# Patient Record
Sex: MALE | Race: WHITE
[De-identification: names, ages, dates, MRNs, and addresses within clinical notes are randomized per-mention and may not be internally consistent; named-entity substitution may affect disease eponyms.]

---

## 2020-08-08 ENCOUNTER — HOSPITAL ENCOUNTER (INPATIENT)
Dept: HOSPITAL 19 - COL.ER | Age: 80
LOS: 5 days | Discharge: NURSING, REHAB OR LONG-TERM CARE | DRG: 871 | End: 2020-08-13
Attending: STUDENT IN AN ORGANIZED HEALTH CARE EDUCATION/TRAINING PROGRAM | Admitting: STUDENT IN AN ORGANIZED HEALTH CARE EDUCATION/TRAINING PROGRAM
Payer: MEDICARE

## 2020-08-08 VITALS — OXYGEN SATURATION: 96 %

## 2020-08-08 VITALS — OXYGEN SATURATION: 93 %

## 2020-08-08 VITALS — OXYGEN SATURATION: 95 %

## 2020-08-08 VITALS — OXYGEN SATURATION: 94 %

## 2020-08-08 VITALS — OXYGEN SATURATION: 97 %

## 2020-08-08 VITALS — OXYGEN SATURATION: 92 %

## 2020-08-08 VITALS — OXYGEN SATURATION: 89 %

## 2020-08-08 VITALS
SYSTOLIC BLOOD PRESSURE: 101 MMHG | OXYGEN SATURATION: 96 % | HEART RATE: 48 BPM | TEMPERATURE: 98.8 F | DIASTOLIC BLOOD PRESSURE: 73 MMHG

## 2020-08-08 VITALS
OXYGEN SATURATION: 95 % | HEART RATE: 65 BPM | SYSTOLIC BLOOD PRESSURE: 110 MMHG | DIASTOLIC BLOOD PRESSURE: 78 MMHG | TEMPERATURE: 98.9 F

## 2020-08-08 VITALS — OXYGEN SATURATION: 98 %

## 2020-08-08 VITALS
TEMPERATURE: 100.6 F | OXYGEN SATURATION: 93 % | HEART RATE: 82 BPM | SYSTOLIC BLOOD PRESSURE: 102 MMHG | DIASTOLIC BLOOD PRESSURE: 65 MMHG

## 2020-08-08 VITALS — OXYGEN SATURATION: 91 %

## 2020-08-08 VITALS — OXYGEN SATURATION: 88 %

## 2020-08-08 VITALS — OXYGEN SATURATION: 87 %

## 2020-08-08 VITALS
HEART RATE: 76 BPM | DIASTOLIC BLOOD PRESSURE: 71 MMHG | OXYGEN SATURATION: 95 % | TEMPERATURE: 100.5 F | SYSTOLIC BLOOD PRESSURE: 106 MMHG

## 2020-08-08 VITALS — OXYGEN SATURATION: 90 %

## 2020-08-08 VITALS — OXYGEN SATURATION: 85 %

## 2020-08-08 VITALS — WEIGHT: 222.23 LBS | BODY MASS INDEX: 30.1 KG/M2 | HEIGHT: 72.01 IN

## 2020-08-08 DIAGNOSIS — J96.12: ICD-10-CM

## 2020-08-08 DIAGNOSIS — E11.40: ICD-10-CM

## 2020-08-08 DIAGNOSIS — D69.6: ICD-10-CM

## 2020-08-08 DIAGNOSIS — E83.42: ICD-10-CM

## 2020-08-08 DIAGNOSIS — N40.0: ICD-10-CM

## 2020-08-08 DIAGNOSIS — E87.6: ICD-10-CM

## 2020-08-08 DIAGNOSIS — E78.5: ICD-10-CM

## 2020-08-08 DIAGNOSIS — I63.9: ICD-10-CM

## 2020-08-08 DIAGNOSIS — F43.10: ICD-10-CM

## 2020-08-08 DIAGNOSIS — A41.9: Primary | ICD-10-CM

## 2020-08-08 DIAGNOSIS — Z20.828: ICD-10-CM

## 2020-08-08 DIAGNOSIS — F32.9: ICD-10-CM

## 2020-08-08 DIAGNOSIS — J96.11: ICD-10-CM

## 2020-08-08 DIAGNOSIS — H10.13: ICD-10-CM

## 2020-08-08 DIAGNOSIS — G89.29: ICD-10-CM

## 2020-08-08 DIAGNOSIS — J44.9: ICD-10-CM

## 2020-08-08 DIAGNOSIS — J98.11: ICD-10-CM

## 2020-08-08 LAB
ALBUMIN SERPL-MCNC: 4.4 GM/DL (ref 3.5–5)
ALP SERPL-CCNC: 79 U/L (ref 50–136)
ALT SERPL-CCNC: 16 U/L (ref 4–49)
ANION GAP SERPL CALC-SCNC: 7 MMOL/L (ref 7–16)
APPEARANCE CSF: CLEAR
APTT PPP: 25.4 SECONDS (ref 26–37)
AST SERPL-CCNC: 22 U/L (ref 15–37)
BASE EXCESS BLDA CALC-SCNC: 1.2 MMOL/L (ref -2–2)
BASE EXCESS BLDA CALC-SCNC: 5.3 MMOL/L (ref -2–2)
BASOPHILS # BLD: 0 10*3/UL (ref 0–0.2)
BASOPHILS NFR BLD AUTO: 0.1 % (ref 0–2)
BILIRUB SERPL-MCNC: 0.4 MG/DL (ref 0–1)
BUN SERPL-MCNC: 22 MG/DL (ref 9–20)
CALCIUM SERPL-MCNC: 10 MG/DL (ref 8.4–10.2)
CHLORIDE SERPL-SCNC: 99 MMOL/L (ref 98–107)
CO2 BLDA-SCNC: 28.9 MMOL/L
CO2 BLDA-SCNC: 33.4 MMOL/L
CO2 SERPL-SCNC: 34 MMOL/L (ref 22–30)
COLOR CSF: COLORLESS
CREAT SERPL-SCNC: 1.18 UMOL/L (ref 0.66–1.25)
CRP SERPL-MCNC: 1.8 MG/DL (ref 0–0.9)
EOSINOPHIL # BLD: 0 10*3/UL (ref 0–0.7)
EOSINOPHIL NFR BLD: 0.3 % (ref 0–4)
ERYTHROCYTE [DISTWIDTH] IN BLOOD BY AUTOMATED COUNT: 13.2 % (ref 11.5–14.5)
GLUCOSE SERPL-MCNC: 105 MG/DL (ref 74–106)
GRANULOCYTES # BLD AUTO: 87.2 % (ref 42.2–75.2)
GRANULOCYTES NFR CSF MANUAL: 100 % (ref 0–6)
HCO3 BLDA-SCNC: 27.4 MEQ/L (ref 22–26)
HCO3 BLDA-SCNC: 31.7 MEQ/L (ref 22–26)
HCT VFR BLD AUTO: 43 % (ref 42–52)
HGB BLD-MCNC: 13.9 G/DL (ref 13.5–18)
INR BLD: 1.1 (ref 0.8–3)
LYMPHOCYTES # BLD: 0.5 10*3/UL (ref 1.2–3.4)
LYMPHOCYTES NFR BLD: 7.8 % (ref 20–51)
MAGNESIUM SERPL-MCNC: 1.5 MG/DL (ref 1.6–2.3)
MCH RBC QN AUTO: 32 PG (ref 27–31)
MCHC RBC AUTO-ENTMCNC: 32 G/DL (ref 33–37)
MCV RBC AUTO: 98 FL (ref 80–100)
MONOCYTES # BLD: 0.3 10*3/UL (ref 0.1–0.6)
MONOCYTES NFR BLD AUTO: 4.3 % (ref 1.7–9.3)
MONOCYTES NFR CSF MANUAL: 0 % (ref 70–100)
NEUTROPHILS # BLD: 6.1 10*3/UL (ref 1.4–6.5)
PCO2 BLDA: 50.1 MMHG (ref 35–45)
PCO2 BLDA: 54.2 MMHG (ref 35–45)
PH UR STRIP.AUTO: 6 [PH] (ref 5–8)
PLATELET # BLD AUTO: 90 K/MM3 (ref 130–400)
PMV BLD AUTO: 13.7 FL (ref 7.4–10.4)
PO2 BLDA: 77.8 MMHG (ref 80–100)
PO2 BLDA: 82.5 MMHG (ref 80–100)
POTASSIUM SERPL-SCNC: 3.6 MMOL/L (ref 3.4–5)
PROT CSF-MCNC: 62 MG/DL (ref 15–45)
PROT SERPL-MCNC: 7.2 GM/DL (ref 6.4–8.2)
PROTHROMBIN TIME: 12.2 SECONDS (ref 9.7–12.8)
RBC # BLD AUTO: 4.37 M/MM3 (ref 4.2–5.6)
RBC # CSF: 24 /MM3 (ref 0–0)
RBC # UR STRIP.AUTO: (no result) /UL
RBC # UR: (no result) /HPF
SAO2 % BLDA: 95.2 % (ref 92–100)
SAO2 % BLDA: 95.8 % (ref 92–100)
SODIUM SERPL-SCNC: 141 MMOL/L (ref 137–145)
SP GR UR STRIP.AUTO: 1.01 (ref 1–1.03)
SQUAMOUS # URNS: (no result) /HPF
TROPONIN I SERPL-MCNC: 0.03 NG/ML (ref 0–0.04)
URN COLLECT METHOD CLASS: (no result)

## 2020-08-08 PROCEDURE — C1892 INTRO/SHEATH,FIXED,PEEL-AWAY: HCPCS

## 2020-08-08 PROCEDURE — C1751 CATH, INF, PER/CENT/MIDLINE: HCPCS

## 2020-08-09 VITALS — OXYGEN SATURATION: 96 %

## 2020-08-09 VITALS — OXYGEN SATURATION: 98 %

## 2020-08-09 VITALS — OXYGEN SATURATION: 97 %

## 2020-08-09 VITALS — OXYGEN SATURATION: 92 %

## 2020-08-09 VITALS — OXYGEN SATURATION: 95 %

## 2020-08-09 VITALS — OXYGEN SATURATION: 90 %

## 2020-08-09 VITALS — OXYGEN SATURATION: 94 %

## 2020-08-09 VITALS
OXYGEN SATURATION: 96 % | DIASTOLIC BLOOD PRESSURE: 69 MMHG | TEMPERATURE: 98.1 F | SYSTOLIC BLOOD PRESSURE: 108 MMHG | HEART RATE: 70 BPM

## 2020-08-09 VITALS — OXYGEN SATURATION: 91 %

## 2020-08-09 VITALS — OXYGEN SATURATION: 93 %

## 2020-08-09 VITALS — OXYGEN SATURATION: 89 %

## 2020-08-09 VITALS — OXYGEN SATURATION: 87 %

## 2020-08-09 VITALS
OXYGEN SATURATION: 95 % | TEMPERATURE: 98.5 F | SYSTOLIC BLOOD PRESSURE: 118 MMHG | HEART RATE: 55 BPM | DIASTOLIC BLOOD PRESSURE: 75 MMHG

## 2020-08-09 VITALS — DIASTOLIC BLOOD PRESSURE: 58 MMHG | OXYGEN SATURATION: 95 % | HEART RATE: 84 BPM | SYSTOLIC BLOOD PRESSURE: 133 MMHG

## 2020-08-09 VITALS
SYSTOLIC BLOOD PRESSURE: 118 MMHG | HEART RATE: 45 BPM | DIASTOLIC BLOOD PRESSURE: 74 MMHG | TEMPERATURE: 99.1 F | OXYGEN SATURATION: 94 %

## 2020-08-09 VITALS — TEMPERATURE: 98.1 F | DIASTOLIC BLOOD PRESSURE: 80 MMHG | HEART RATE: 67 BPM | SYSTOLIC BLOOD PRESSURE: 134 MMHG

## 2020-08-09 VITALS — HEART RATE: 75 BPM | TEMPERATURE: 98.2 F | DIASTOLIC BLOOD PRESSURE: 70 MMHG | SYSTOLIC BLOOD PRESSURE: 149 MMHG

## 2020-08-09 VITALS — OXYGEN SATURATION: 86 %

## 2020-08-09 VITALS — OXYGEN SATURATION: 85 %

## 2020-08-09 VITALS — OXYGEN SATURATION: 88 %

## 2020-08-09 LAB
ANION GAP SERPL CALC-SCNC: 3 MMOL/L (ref 7–16)
BASE EXCESS BLDA CALC-SCNC: 1.8 MMOL/L (ref -2–2)
BASOPHILS # BLD: 0 10*3/UL (ref 0–0.2)
BASOPHILS NFR BLD AUTO: 0.1 % (ref 0–2)
BUN SERPL-MCNC: 20 MG/DL (ref 9–20)
CALCIUM SERPL-MCNC: 9.1 MG/DL (ref 8.4–10.2)
CHLORIDE SERPL-SCNC: 106 MMOL/L (ref 98–107)
CO2 BLDA-SCNC: 28.3 MMOL/L
CO2 SERPL-SCNC: 30 MMOL/L (ref 22–30)
CREAT SERPL-SCNC: 1.04 UMOL/L (ref 0.66–1.25)
EOSINOPHIL # BLD: 0 10*3/UL (ref 0–0.7)
EOSINOPHIL NFR BLD: 0.1 % (ref 0–4)
ERYTHROCYTE [DISTWIDTH] IN BLOOD BY AUTOMATED COUNT: 13.3 % (ref 11.5–14.5)
GLUCOSE SERPL-MCNC: 100 MG/DL (ref 74–106)
GRANULOCYTES # BLD AUTO: 77.9 % (ref 42.2–75.2)
HCO3 BLDA-SCNC: 26.9 MEQ/L (ref 22–26)
HCT VFR BLD AUTO: 36.7 % (ref 42–52)
HGB BLD-MCNC: 11.5 G/DL (ref 13.5–18)
INHALED O2 CONCENTRATION: 30 %
LYMPHOCYTES # BLD: 1 10*3/UL (ref 1.2–3.4)
LYMPHOCYTES NFR BLD: 12.7 % (ref 20–51)
MCH RBC QN AUTO: 31 PG (ref 27–31)
MCHC RBC AUTO-ENTMCNC: 31 G/DL (ref 33–37)
MCV RBC AUTO: 100 FL (ref 80–100)
MONOCYTES # BLD: 0.7 10*3/UL (ref 0.1–0.6)
MONOCYTES NFR BLD AUTO: 8.7 % (ref 1.7–9.3)
NEUTROPHILS # BLD: 5.9 10*3/UL (ref 1.4–6.5)
PCO2 BLDA: 44.4 MMHG (ref 35–45)
PLATELET # BLD AUTO: 77 K/MM3 (ref 130–400)
PMV BLD AUTO: 13.6 FL (ref 7.4–10.4)
PO2 BLDA: 92.2 MMHG (ref 80–100)
POTASSIUM SERPL-SCNC: 4.2 MMOL/L (ref 3.4–5)
RBC # BLD AUTO: 3.66 M/MM3 (ref 4.2–5.6)
SAO2 % BLDA: 96.9 % (ref 92–100)
SODIUM SERPL-SCNC: 140 MMOL/L (ref 137–145)

## 2020-08-10 VITALS — SYSTOLIC BLOOD PRESSURE: 115 MMHG | HEART RATE: 81 BPM | TEMPERATURE: 98.3 F | DIASTOLIC BLOOD PRESSURE: 80 MMHG

## 2020-08-10 VITALS — HEART RATE: 65 BPM | DIASTOLIC BLOOD PRESSURE: 67 MMHG | SYSTOLIC BLOOD PRESSURE: 124 MMHG | TEMPERATURE: 98.3 F

## 2020-08-10 VITALS — DIASTOLIC BLOOD PRESSURE: 81 MMHG | SYSTOLIC BLOOD PRESSURE: 137 MMHG | TEMPERATURE: 98.3 F | HEART RATE: 65 BPM

## 2020-08-10 VITALS — TEMPERATURE: 97.8 F | HEART RATE: 81 BPM | DIASTOLIC BLOOD PRESSURE: 63 MMHG | SYSTOLIC BLOOD PRESSURE: 112 MMHG

## 2020-08-10 VITALS — HEART RATE: 68 BPM | TEMPERATURE: 98 F | SYSTOLIC BLOOD PRESSURE: 105 MMHG | DIASTOLIC BLOOD PRESSURE: 66 MMHG

## 2020-08-10 VITALS — DIASTOLIC BLOOD PRESSURE: 78 MMHG | HEART RATE: 70 BPM | TEMPERATURE: 97.6 F | SYSTOLIC BLOOD PRESSURE: 137 MMHG

## 2020-08-10 LAB
ANION GAP SERPL CALC-SCNC: 4 MMOL/L (ref 7–16)
BASOPHILS # BLD: 0 10*3/UL (ref 0–0.2)
BASOPHILS NFR BLD AUTO: 0.3 % (ref 0–2)
BUN SERPL-MCNC: 16 MG/DL (ref 9–20)
CALCIUM SERPL-MCNC: 9.2 MG/DL (ref 8.4–10.2)
CHLORIDE SERPL-SCNC: 105 MMOL/L (ref 98–107)
CO2 SERPL-SCNC: 31 MMOL/L (ref 22–30)
CREAT SERPL-SCNC: 1.1 UMOL/L (ref 0.66–1.25)
EOSINOPHIL # BLD: 0 10*3/UL (ref 0–0.7)
EOSINOPHIL NFR BLD: 0.7 % (ref 0–4)
ERYTHROCYTE [DISTWIDTH] IN BLOOD BY AUTOMATED COUNT: 13.4 % (ref 11.5–14.5)
GLUCOSE SERPL-MCNC: 86 MG/DL (ref 74–106)
GRANULOCYTES # BLD AUTO: 69.1 % (ref 42.2–75.2)
HCT VFR BLD AUTO: 37.8 % (ref 42–52)
HGB BLD-MCNC: 12.1 G/DL (ref 13.5–18)
LYMPHOCYTES # BLD: 1.3 10*3/UL (ref 1.2–3.4)
LYMPHOCYTES NFR BLD: 21.2 % (ref 20–51)
MCH RBC QN AUTO: 32 PG (ref 27–31)
MCHC RBC AUTO-ENTMCNC: 32 G/DL (ref 33–37)
MCV RBC AUTO: 99 FL (ref 80–100)
MONOCYTES # BLD: 0.5 10*3/UL (ref 0.1–0.6)
MONOCYTES NFR BLD AUTO: 8.2 % (ref 1.7–9.3)
NEUTROPHILS # BLD: 4.2 10*3/UL (ref 1.4–6.5)
PLATELET # BLD AUTO: 82 K/MM3 (ref 130–400)
PMV BLD AUTO: 12.8 FL (ref 7.4–10.4)
POTASSIUM SERPL-SCNC: 3.4 MMOL/L (ref 3.4–5)
RBC # BLD AUTO: 3.83 M/MM3 (ref 4.2–5.6)
SODIUM SERPL-SCNC: 140 MMOL/L (ref 137–145)

## 2020-08-10 PROCEDURE — 02HV33Z INSERTION OF INFUSION DEVICE INTO SUPERIOR VENA CAVA, PERCUTANEOUS APPROACH: ICD-10-PCS

## 2020-08-11 VITALS — HEART RATE: 86 BPM | TEMPERATURE: 98.5 F | DIASTOLIC BLOOD PRESSURE: 56 MMHG | SYSTOLIC BLOOD PRESSURE: 98 MMHG

## 2020-08-11 VITALS — HEART RATE: 72 BPM | TEMPERATURE: 98.2 F | SYSTOLIC BLOOD PRESSURE: 127 MMHG | DIASTOLIC BLOOD PRESSURE: 46 MMHG

## 2020-08-11 VITALS — SYSTOLIC BLOOD PRESSURE: 123 MMHG | DIASTOLIC BLOOD PRESSURE: 72 MMHG | HEART RATE: 66 BPM | TEMPERATURE: 98.5 F

## 2020-08-11 VITALS — DIASTOLIC BLOOD PRESSURE: 65 MMHG | SYSTOLIC BLOOD PRESSURE: 128 MMHG | HEART RATE: 76 BPM | TEMPERATURE: 98 F

## 2020-08-11 VITALS — TEMPERATURE: 98.2 F | SYSTOLIC BLOOD PRESSURE: 129 MMHG | HEART RATE: 78 BPM | DIASTOLIC BLOOD PRESSURE: 85 MMHG

## 2020-08-11 VITALS — HEART RATE: 81 BPM | DIASTOLIC BLOOD PRESSURE: 48 MMHG | SYSTOLIC BLOOD PRESSURE: 105 MMHG | TEMPERATURE: 98.4 F

## 2020-08-11 LAB
ANION GAP SERPL CALC-SCNC: 6 MMOL/L (ref 7–16)
BASOPHILS # BLD: 0 10*3/UL (ref 0–0.2)
BASOPHILS NFR BLD AUTO: 0.4 % (ref 0–2)
BUN SERPL-MCNC: 15 MG/DL (ref 9–20)
CALCIUM SERPL-MCNC: 9 MG/DL (ref 8.4–10.2)
CHLORIDE SERPL-SCNC: 102 MMOL/L (ref 98–107)
CO2 SERPL-SCNC: 30 MMOL/L (ref 22–30)
CREAT SERPL-SCNC: 1.03 UMOL/L (ref 0.66–1.25)
EOSINOPHIL # BLD: 0.1 10*3/UL (ref 0–0.7)
EOSINOPHIL NFR BLD: 1.2 % (ref 0–4)
ERYTHROCYTE [DISTWIDTH] IN BLOOD BY AUTOMATED COUNT: 13.2 % (ref 11.5–14.5)
GLUCOSE SERPL-MCNC: 84 MG/DL (ref 74–106)
GRANULOCYTES # BLD AUTO: 67.3 % (ref 42.2–75.2)
HCT VFR BLD AUTO: 35.9 % (ref 42–52)
HGB BLD-MCNC: 11.8 G/DL (ref 13.5–18)
LYMPHOCYTES # BLD: 1.2 10*3/UL (ref 1.2–3.4)
LYMPHOCYTES NFR BLD: 20.4 % (ref 20–51)
MCH RBC QN AUTO: 32 PG (ref 27–31)
MCHC RBC AUTO-ENTMCNC: 33 G/DL (ref 33–37)
MCV RBC AUTO: 97 FL (ref 80–100)
MONOCYTES # BLD: 0.6 10*3/UL (ref 0.1–0.6)
MONOCYTES NFR BLD AUTO: 10.2 % (ref 1.7–9.3)
NEUTROPHILS # BLD: 3.8 10*3/UL (ref 1.4–6.5)
PLATELET # BLD AUTO: 81 K/MM3 (ref 130–400)
PMV BLD AUTO: 12.9 FL (ref 7.4–10.4)
POTASSIUM SERPL-SCNC: 3.2 MMOL/L (ref 3.4–5)
RBC # BLD AUTO: 3.72 M/MM3 (ref 4.2–5.6)
SODIUM SERPL-SCNC: 138 MMOL/L (ref 137–145)

## 2020-08-12 VITALS — TEMPERATURE: 98.6 F | SYSTOLIC BLOOD PRESSURE: 101 MMHG | DIASTOLIC BLOOD PRESSURE: 46 MMHG | HEART RATE: 76 BPM

## 2020-08-12 VITALS — HEART RATE: 64 BPM | DIASTOLIC BLOOD PRESSURE: 76 MMHG | TEMPERATURE: 98.6 F | SYSTOLIC BLOOD PRESSURE: 132 MMHG

## 2020-08-12 VITALS — SYSTOLIC BLOOD PRESSURE: 124 MMHG | TEMPERATURE: 98.6 F | DIASTOLIC BLOOD PRESSURE: 49 MMHG | HEART RATE: 65 BPM

## 2020-08-12 VITALS — TEMPERATURE: 97.8 F | SYSTOLIC BLOOD PRESSURE: 125 MMHG | DIASTOLIC BLOOD PRESSURE: 81 MMHG | HEART RATE: 83 BPM

## 2020-08-12 VITALS — SYSTOLIC BLOOD PRESSURE: 110 MMHG | DIASTOLIC BLOOD PRESSURE: 57 MMHG | TEMPERATURE: 98.7 F | HEART RATE: 66 BPM

## 2020-08-12 VITALS — DIASTOLIC BLOOD PRESSURE: 41 MMHG | TEMPERATURE: 98.1 F | HEART RATE: 83 BPM | SYSTOLIC BLOOD PRESSURE: 115 MMHG

## 2020-08-12 LAB
ANION GAP SERPL CALC-SCNC: 7 MMOL/L (ref 7–16)
BASOPHILS # BLD: 0 10*3/UL (ref 0–0.2)
BASOPHILS NFR BLD AUTO: 0.4 % (ref 0–2)
BUN SERPL-MCNC: 12 MG/DL (ref 9–20)
CALCIUM SERPL-MCNC: 9.2 MG/DL (ref 8.4–10.2)
CHLORIDE SERPL-SCNC: 102 MMOL/L (ref 98–107)
CO2 SERPL-SCNC: 29 MMOL/L (ref 22–30)
CREAT SERPL-SCNC: 1.1 UMOL/L (ref 0.66–1.25)
EOSINOPHIL # BLD: 0.1 10*3/UL (ref 0–0.7)
EOSINOPHIL NFR BLD: 1.6 % (ref 0–4)
ERYTHROCYTE [DISTWIDTH] IN BLOOD BY AUTOMATED COUNT: 13.2 % (ref 11.5–14.5)
GLUCOSE SERPL-MCNC: 86 MG/DL (ref 74–106)
GRANULOCYTES # BLD AUTO: 65.3 % (ref 42.2–75.2)
HCT VFR BLD AUTO: 35.4 % (ref 42–52)
HGB BLD-MCNC: 12 G/DL (ref 13.5–18)
LYMPHOCYTES # BLD: 1.1 10*3/UL (ref 1.2–3.4)
LYMPHOCYTES NFR BLD: 21.4 % (ref 20–51)
MAGNESIUM SERPL-MCNC: 1.5 MG/DL (ref 1.6–2.3)
MCH RBC QN AUTO: 32 PG (ref 27–31)
MCHC RBC AUTO-ENTMCNC: 34 G/DL (ref 33–37)
MCV RBC AUTO: 96 FL (ref 80–100)
MONOCYTES # BLD: 0.6 10*3/UL (ref 0.1–0.6)
MONOCYTES NFR BLD AUTO: 10.9 % (ref 1.7–9.3)
NEUTROPHILS # BLD: 3.4 10*3/UL (ref 1.4–6.5)
PLATELET # BLD AUTO: 91 K/MM3 (ref 130–400)
PMV BLD AUTO: 12.4 FL (ref 7.4–10.4)
POTASSIUM SERPL-SCNC: 3.6 MMOL/L (ref 3.4–5)
RBC # BLD AUTO: 3.7 M/MM3 (ref 4.2–5.6)
SODIUM SERPL-SCNC: 138 MMOL/L (ref 137–145)

## 2020-08-13 VITALS — SYSTOLIC BLOOD PRESSURE: 138 MMHG | TEMPERATURE: 98.1 F | DIASTOLIC BLOOD PRESSURE: 58 MMHG | HEART RATE: 74 BPM

## 2020-08-13 VITALS — HEART RATE: 67 BPM | SYSTOLIC BLOOD PRESSURE: 154 MMHG | TEMPERATURE: 98.7 F | DIASTOLIC BLOOD PRESSURE: 86 MMHG

## 2020-08-13 VITALS — SYSTOLIC BLOOD PRESSURE: 119 MMHG | DIASTOLIC BLOOD PRESSURE: 52 MMHG | TEMPERATURE: 98.6 F | HEART RATE: 76 BPM

## 2020-08-13 VITALS — HEART RATE: 76 BPM | TEMPERATURE: 98.6 F | DIASTOLIC BLOOD PRESSURE: 52 MMHG | SYSTOLIC BLOOD PRESSURE: 119 MMHG

## 2020-08-13 LAB
ANION GAP SERPL CALC-SCNC: 6 MMOL/L (ref 7–16)
BUN SERPL-MCNC: 10 MG/DL (ref 9–20)
CALCIUM SERPL-MCNC: 9.3 MG/DL (ref 8.4–10.2)
CHLORIDE SERPL-SCNC: 102 MMOL/L (ref 98–107)
CHOLEST SPEC-SCNC: 120 MG/DL (ref 120–200)
CHOLEST/HDLC SERPL-SRTO: 3.8
CO2 SERPL-SCNC: 31 MMOL/L (ref 22–30)
CREAT SERPL-SCNC: 1.2 UMOL/L (ref 0.66–1.25)
EOSINOPHIL NFR BLD: 2 % (ref 0–4)
ERYTHROCYTE [DISTWIDTH] IN BLOOD BY AUTOMATED COUNT: 13.2 % (ref 11.5–14.5)
GLUCOSE SERPL-MCNC: 87 MG/DL (ref 74–106)
HCT VFR BLD AUTO: 36.3 % (ref 42–52)
HDLC SERPL-MCNC: 31 MG/DL
HGB BLD-MCNC: 11.9 G/DL (ref 13.5–18)
LDLC SERPL-MCNC: 66 MG/DL
LYMPHOCYTES NFR BLD MANUAL: 29 % (ref 20–51)
MAGNESIUM SERPL-MCNC: 2.2 MG/DL (ref 1.6–2.3)
MCH RBC QN AUTO: 32 PG (ref 27–31)
MCHC RBC AUTO-ENTMCNC: 33 G/DL (ref 33–37)
MCV RBC AUTO: 96 FL (ref 80–100)
MONOCYTES NFR BLD: 6 % (ref 1.7–9.3)
NEUTS BAND NFR BLD: 1 % (ref 0–10)
NEUTS SEG NFR BLD MANUAL: 62 % (ref 42–75.2)
PLATELET # BLD AUTO: 91 K/MM3 (ref 130–400)
PLATELET BLD QL SMEAR: (no result)
PMV BLD AUTO: 12.9 FL (ref 7.4–10.4)
POTASSIUM SERPL-SCNC: 3.4 MMOL/L (ref 3.4–5)
RBC # BLD AUTO: 3.77 M/MM3 (ref 4.2–5.6)
SODIUM SERPL-SCNC: 139 MMOL/L (ref 137–145)
TRIGL SERPL-MCNC: 113 MG/DL

## 2020-08-17 ENCOUNTER — HOSPITAL ENCOUNTER (EMERGENCY)
Dept: HOSPITAL 19 - COL.ER | Age: 80
Discharge: HOME | End: 2020-08-17
Payer: MEDICARE

## 2020-08-17 VITALS — HEART RATE: 102 BPM | DIASTOLIC BLOOD PRESSURE: 68 MMHG | SYSTOLIC BLOOD PRESSURE: 126 MMHG

## 2020-08-17 VITALS — BODY MASS INDEX: 29.68 KG/M2 | WEIGHT: 200.4 LBS | HEIGHT: 69.02 IN

## 2020-08-17 VITALS — TEMPERATURE: 98.3 F

## 2020-08-17 DIAGNOSIS — J44.9: ICD-10-CM

## 2020-08-17 DIAGNOSIS — Z79.82: ICD-10-CM

## 2020-08-17 DIAGNOSIS — R41.0: Primary | ICD-10-CM

## 2020-08-17 DIAGNOSIS — I10: ICD-10-CM

## 2020-08-17 LAB
ALBUMIN SERPL-MCNC: 4.2 GM/DL (ref 3.5–5)
ALP SERPL-CCNC: 74 U/L (ref 50–136)
ALT SERPL-CCNC: 12 U/L (ref 4–49)
ANION GAP SERPL CALC-SCNC: 7 MMOL/L (ref 7–16)
AST SERPL-CCNC: 26 U/L (ref 15–37)
BASE EXCESS BLDA CALC-SCNC: 1.9 MMOL/L (ref -2–2)
BASOPHILS # BLD: 0 10*3/UL (ref 0–0.2)
BASOPHILS NFR BLD AUTO: 0.2 % (ref 0–2)
BILIRUB SERPL-MCNC: 0.7 MG/DL (ref 0–1)
BUN SERPL-MCNC: 22 MG/DL (ref 9–20)
CALCIUM SERPL-MCNC: 10 MG/DL (ref 8.4–10.2)
CHLORIDE SERPL-SCNC: 100 MMOL/L (ref 98–107)
CO2 BLDA-SCNC: 28.2 MMOL/L
CO2 SERPL-SCNC: 35 MMOL/L (ref 22–30)
CREAT SERPL-SCNC: 1.45 UMOL/L (ref 0.66–1.25)
CRP SERPL-MCNC: 17 MG/DL (ref 0–0.9)
EOSINOPHIL # BLD: 0.1 10*3/UL (ref 0–0.7)
EOSINOPHIL NFR BLD: 0.7 % (ref 0–4)
ERYTHROCYTE [DISTWIDTH] IN BLOOD BY AUTOMATED COUNT: 13.7 % (ref 11.5–14.5)
GLUCOSE SERPL-MCNC: 120 MG/DL (ref 74–106)
GLUCOSE UR QL STRIP.AUTO: (no result)
GRANULOCYTES # BLD AUTO: 80.5 % (ref 42.2–75.2)
HCO3 BLDA-SCNC: 26.9 MEQ/L (ref 22–26)
HCT VFR BLD AUTO: 38.9 % (ref 42–52)
HGB BLD-MCNC: 12.5 G/DL (ref 13.5–18)
HYALINE CASTS #/AREA URNS LPF: (no result) /LPF
INR BLD: 1.3 (ref 0.8–3)
LIPASE SERPL-CCNC: 33 U/L (ref 23–300)
LYMPHOCYTES # BLD: 0.9 10*3/UL (ref 1.2–3.4)
LYMPHOCYTES NFR BLD: 10.2 % (ref 20–51)
MCH RBC QN AUTO: 32 PG (ref 27–31)
MCHC RBC AUTO-ENTMCNC: 32 G/DL (ref 33–37)
MCV RBC AUTO: 99 FL (ref 80–100)
MONOCYTES # BLD: 0.7 10*3/UL (ref 0.1–0.6)
MONOCYTES NFR BLD AUTO: 7.9 % (ref 1.7–9.3)
NEUTROPHILS # BLD: 7 10*3/UL (ref 1.4–6.5)
PCO2 BLDA: 43.7 MMHG (ref 35–45)
PH UR STRIP.AUTO: 5 [PH] (ref 5–8)
PLATELET # BLD AUTO: 186 K/MM3 (ref 130–400)
PMV BLD AUTO: 11.8 FL (ref 7.4–10.4)
PO2 BLDA: 75.7 MMHG (ref 80–100)
POTASSIUM SERPL-SCNC: 4 MMOL/L (ref 3.4–5)
PROT SERPL-MCNC: 7.3 GM/DL (ref 6.4–8.2)
PROTHROMBIN TIME: 14.1 SECONDS (ref 9.7–12.8)
RBC # BLD AUTO: 3.92 M/MM3 (ref 4.2–5.6)
RBC # UR STRIP.AUTO: (no result) /UL
RBC # UR: (no result) /HPF
SAO2 % BLDA: 94.9 % (ref 92–100)
SODIUM SERPL-SCNC: 142 MMOL/L (ref 137–145)
SP GR UR STRIP.AUTO: 1.02 (ref 1–1.03)
SQUAMOUS # URNS: (no result) /HPF
TROPONIN I SERPL-MCNC: 0.02 NG/ML (ref 0–0.04)
UA DIPSTICK PNL UR STRIP.AUTO: (no result)
URINE LEUKOCYTE ESTERASE: (no result)
URN COLLECT METHOD CLASS: (no result)

## 2020-08-17 NOTE — NUR
received a consult for the patient due multiple hospitizations.
Loree Michaud pallative care nurse was also consulted. DOMINIC met with the patient's
son, Vlad to discuss options. The decision was made to go to the [facility]. DOMINIC faxed referral. Parker from [facility] report they can
take the patient and would need to be there at 1430. The patient's PCP Dr. Sinha is not available to follow but Dr. Brito was agreeable.  Vlad was
agreeable to transport the patient. He would need the patient's oxygen from
[facility]. DOMINIC collaborated with Keli from [facility] to have
the patient's oxygen at the [facility] at 1300. DOMINIC informed Vlad. DOMINIC faxed
discharge order to [facility]. DOMINIC collaborated the above information with ED
physician.

## 2020-08-17 NOTE — NUR
Pt was seen in the ER upon reciept of the palliative care referral.  At the
time of my visit he was aware of where his son, Vlad, was working and talked
about his activities.  I was able to reach Vlad by phone after several
attempts as his reception is not always good.  Vlad arrived at the ER and the
team of Loree Michaud RN, Patti Brantley LMSW and ROLAN Sidhu all met with Vlad
to discuss his father's situation, and further discuss the recommendation of
hospice services.  Vlad has talked with us during earlier admissions and even
with [facility].  He recognizes that his father is not
able to stay out of the hospital for very long at all and that this is not
expected to change.  His #1 goal is to have his parent's together as they face
Lewis's death and that he and his sister be able to visit and suopport them
as well.  Josef will hope to explore options available for them on
hospice there and at the daughter's request referral is also being sent to
[facility].  Support provided to son, Vlad.  Please see
palliative care consult.